# Patient Record
Sex: MALE | Race: OTHER | Employment: FULL TIME | URBAN - METROPOLITAN AREA
[De-identification: names, ages, dates, MRNs, and addresses within clinical notes are randomized per-mention and may not be internally consistent; named-entity substitution may affect disease eponyms.]

---

## 2018-04-09 NOTE — PROGRESS NOTES
SPOKE WITH THE PATIENT   HE IS CURRENTLY TAKING EFFEXOR, ADDERALL AND BUSPAR.   PER DR CHAMBERS PT CAN STAY ON EFFEXOR AND BUSPAR  PT WAS INFORMED TO STAY OFF ADDERALL FOR 48 HRS

## 2018-04-13 ENCOUNTER — HOSPITAL ENCOUNTER (OUTPATIENT)
Dept: GENERAL RADIOLOGY | Age: 38
Discharge: HOME | End: 2018-04-13
Attending: NEUROLOGICAL SURGERY | Admitting: RADIOLOGY
Payer: COMMERCIAL

## 2018-04-13 ENCOUNTER — APPOINTMENT (OUTPATIENT)
Dept: CT IMAGING | Age: 38
End: 2018-04-13
Attending: NEUROLOGICAL SURGERY
Payer: COMMERCIAL

## 2018-04-13 VITALS
HEIGHT: 66 IN | DIASTOLIC BLOOD PRESSURE: 70 MMHG | SYSTOLIC BLOOD PRESSURE: 124 MMHG | WEIGHT: 206 LBS | HEART RATE: 79 BPM | OXYGEN SATURATION: 97 % | TEMPERATURE: 97.8 F | RESPIRATION RATE: 16 BRPM | BODY MASS INDEX: 33.11 KG/M2

## 2018-04-13 DIAGNOSIS — M54.16 LUMBAR RADICULOPATHY: ICD-10-CM

## 2018-04-13 PROCEDURE — 74011250637 HC RX REV CODE- 250/637: Performed by: RADIOLOGY

## 2018-04-13 PROCEDURE — 74011636320 HC RX REV CODE- 636/320: Performed by: NEUROLOGICAL SURGERY

## 2018-04-13 PROCEDURE — 62304 MYELOGRAPHY LUMBAR INJECTION: CPT

## 2018-04-13 PROCEDURE — 72132 CT LUMBAR SPINE W/DYE: CPT

## 2018-04-13 RX ORDER — OXYCODONE AND ACETAMINOPHEN 10; 325 MG/1; MG/1
1 TABLET ORAL
Status: DISCONTINUED | OUTPATIENT
Start: 2018-04-13 | End: 2018-04-13 | Stop reason: HOSPADM

## 2018-04-13 RX ORDER — BUSPIRONE HYDROCHLORIDE 10 MG/1
10 TABLET ORAL 2 TIMES DAILY
COMMUNITY

## 2018-04-13 RX ORDER — DIAZEPAM 5 MG/1
10 TABLET ORAL
Status: COMPLETED | OUTPATIENT
Start: 2018-04-13 | End: 2018-04-13

## 2018-04-13 RX ORDER — LIDOCAINE HYDROCHLORIDE 10 MG/ML
1-5 INJECTION, SOLUTION EPIDURAL; INFILTRATION; INTRACAUDAL; PERINEURAL
Status: DISCONTINUED | OUTPATIENT
Start: 2018-04-13 | End: 2018-04-13 | Stop reason: HOSPADM

## 2018-04-13 RX ORDER — ACETAMINOPHEN 325 MG/1
650 TABLET ORAL
Status: DISCONTINUED | OUTPATIENT
Start: 2018-04-13 | End: 2018-04-13 | Stop reason: HOSPADM

## 2018-04-13 RX ORDER — DEXTROAMPHETAMINE SACCHARATE, AMPHETAMINE ASPARTATE, DEXTROAMPHETAMINE SULFATE AND AMPHETAMINE SULFATE 1.25; 1.25; 1.25; 1.25 MG/1; MG/1; MG/1; MG/1
5 TABLET ORAL
COMMUNITY
End: 2018-04-24

## 2018-04-13 RX ORDER — VENLAFAXINE 100 MG/1
150 TABLET ORAL 2 TIMES DAILY
COMMUNITY
End: 2018-04-24

## 2018-04-13 RX ORDER — LIDOCAINE HYDROCHLORIDE 10 MG/ML
1-5 INJECTION, SOLUTION EPIDURAL; INFILTRATION; INTRACAUDAL; PERINEURAL
Status: COMPLETED | OUTPATIENT
Start: 2018-04-13 | End: 2018-04-13

## 2018-04-13 RX ORDER — TRAZODONE HYDROCHLORIDE 50 MG/1
TABLET ORAL
COMMUNITY

## 2018-04-13 RX ORDER — NALOXONE HYDROCHLORIDE 0.4 MG/ML
0.4 INJECTION, SOLUTION INTRAMUSCULAR; INTRAVENOUS; SUBCUTANEOUS AS NEEDED
Status: DISCONTINUED | OUTPATIENT
Start: 2018-04-13 | End: 2018-04-13 | Stop reason: HOSPADM

## 2018-04-13 RX ADMIN — LIDOCAINE HYDROCHLORIDE 5 ML: 10 INJECTION, SOLUTION EPIDURAL; INFILTRATION; INTRACAUDAL; PERINEURAL at 11:01

## 2018-04-13 RX ADMIN — IOPAMIDOL 20 ML: 408 INJECTION, SOLUTION INTRATHECAL at 11:01

## 2018-04-13 RX ADMIN — DIAZEPAM 10 MG: 5 TABLET ORAL at 10:54

## 2018-04-13 NOTE — PROCEDURES
Vascular & Interventional Radiology Brief Procedure Note    Interventional Radiologist: Guanakito Curry    Procedure(s) Performed:  Myelogram, lumbar    Anesthesia:  Local      Findings:  Successful lumbar myelogram via L2/3 puncture.      Complications: None    Estimated Blood Loss:  minimal    Tubes and Drains: None    Specimens: None    Condition: Good    Disposition:  Outpatient    Plan: Observation x 4hrs    Lisseth Sabillon MD  [facility]  Vascular & Interventional Radiology  4/13/2018

## 2018-04-13 NOTE — IP AVS SNAPSHOT
Shaan Farias 
 
 
 [address] [place] [address] Patient: Alexis Lawrence MRN: [id number] SMN:[id number] About your hospitalization You were admitted on:  April 13, 2018 You last received care in the:  [address] [place] [address] You were discharged on:  April 13, 2018 Why you were hospitalized Your primary diagnosis was:  Not on File Follow-up Information Follow up With Details Comments Contact Info None   None (395) Patient stated that they have no PCP Discharge Orders None A check  indicates which time of day the medication should be taken. My Medications CONTINUE taking these medications Instructions Each Dose to Equal  
 Morning Noon Evening Bedtime ADDERALL 5 mg tablet Generic drug:  dextroamphetamine-amphetamine Your last dose was: Your next dose is: Take 5 mg by mouth. 5 mg  
    
   
   
   
  
 busPIRone 10 mg tablet Commonly known as:  BUSPAR Your last dose was: Your next dose is: Take 10 mg by mouth three (3) times daily. 10 mg  
    
   
   
   
  
 traZODone 50 mg tablet Commonly known as:  Noreene Vicky Your last dose was: Your next dose is: Take  by mouth nightly. venlafaxine 100 mg tablet Commonly known as:  [facility] [place] [facility] Your last dose was: Your next dose is: Take 100 mg by mouth three (3) times daily. 100 mg Discharge Instructions Valium (Diazepam) Discharge instructions You received the narcotic listed above. This medication was given to you to help decrease your anxiety/discomfort and allow you to complete your examination Common Side Effects: 
 
Impairment of memory for up to 24 hours, sleepiness, slurred speech, dizziness, visual disturbance such as blurred vision or difficulty focusing, confusion, euphoria (pain relief), chills, ears feeling blocked or dreaming. It is recommended that you do not drive or operate equipment for at least 24 hours. You should not drink any alcoholic beverages for at least 24 hours. Do not take any other muscle relaxers, sedatives, hypnotics, or mood altering medications for 24 hours unless ordered by your physician who is aware that you received this medication. Call your physician for any questions or problems. He/She may contact this Radiology Department for further information. Chaka Wilson RN  
 
 
DISCHARGE SUMMARY from Nurse PATIENT INSTRUCTIONS: 
 
After general anesthesia or intravenous sedation, for 24 hours or while taking prescription Narcotics: · Limit your activities · Do not drive and operate hazardous machinery · Do not make important personal or business decisions · Do  not drink alcoholic beverages · If you have not urinated within 8 hours after discharge, please contact your surgeon on call. Report the following to your surgeon: 
· Excessive pain, swelling, redness or odor of or around the surgical area · Temperature over 100.5 · Nausea and vomiting lasting longer than 4 hours or if unable to take medications · Any signs of decreased circulation or nerve impairment to extremity: change in color, persistent  numbness, tingling, coldness or increase pain · Any questions What to do at Home: These are general instructions for a healthy lifestyle: No smoking/ No tobacco products/ Avoid exposure to second hand smoke Surgeon General's Warning:  Quitting smoking now greatly reduces serious risk to your health. Obesity, smoking, and sedentary lifestyle greatly increases your risk for illness A healthy diet, regular physical exercise & weight monitoring are important for maintaining a healthy lifestyle You may be retaining fluid if you have a history of heart failure or if you experience any of the following symptoms:  Weight gain of 3 pounds or more overnight or 5 pounds in a week, increased swelling in our hands or feet or shortness of breath while lying flat in bed. Please call your doctor as soon as you notice any of these symptoms; do not wait until your next office visit. Recognize signs and symptoms of STROKE: 
 
F-face looks uneven A-arms unable to move or move unevenly S-speech slurred or non-existent T-time-call 911 as soon as signs and symptoms begin-DO NOT go Back to bed or wait to see if you get better-TIME IS BRAIN. Warning Signs of HEART ATTACK Call 911 if you have these symptoms: 
? Chest discomfort. Most heart attacks involve discomfort in the center of the chest that lasts more than a few minutes, or that goes away and comes back. It can feel like uncomfortable pressure, squeezing, fullness, or pain. ? Discomfort in other areas of the upper body. Symptoms can include pain or discomfort in one or both arms, the back, neck, jaw, or stomach. ? Shortness of breath with or without chest discomfort. ? Other signs may include breaking out in a cold sweat, nausea, or lightheadedness. Don't wait more than five minutes to call [address]! Fast action can save your life. Calling 911 is almost always the fastest way to get lifesaving treatment. Emergency Medical Services staff can begin treatment when they arrive  up to an hour sooner than if someone gets to the hospital by car. The discharge information has been reviewed with the patient. The patient verbalized understanding. Discharge medications reviewed with the patient and appropriate educational materials and side effects teaching were provided. ___________________________________________________________________________________________________________________________________ Patient armband removed and given to patient to take home.   Patient was informed of the privacy risks if armband lost or stolen Introducing [facility] & HEALTH SERVICES! [employer] introduces [employer] patient portal. Now you can access parts of your medical record, email your doctor's office, and request medication refills online. 1. In your internet browser, go to [email or web address] 2. Click on the First Time User? Click Here link in the Sign In box. You will see the New Member Sign Up page. 3. Enter your [employer] Access Code exactly as it appears below. You will not need to use this code after youve completed the sign-up process. If you do not sign up before the expiration date, you must request a new code. · [employer] Access Code: [email or web address] Expires: 7/3/2018 11:59 AM 
 
4. Enter the last four digits of your Social Security Number (xxxx) and Date of Birth (mm/dd/yyyy) as indicated and click Submit. You will be taken to the next sign-up page. 5. Create a [employer] ID. This will be your [employer] login ID and cannot be changed, so think of one that is secure and easy to remember. 6. Create a [employer] password. You can change your password at any time. 7. Enter your Password Reset Question and Answer. This can be used at a later time if you forget your password. 8. Enter your e-mail address. You will receive e-mail notification when new information is available in [address] 9. Click Sign Up. You can now view and download portions of your medical record. 10. Click the Download Summary menu link to download a portable copy of your medical information. If you have questions, please visit the Frequently Asked Questions section of the [employer] website. Remember, [employer] is NOT to be used for urgent needs. For medical emergencies, dial 911. Now available from your iPhone and Android! Introducing Adán Mcintosh As a [employer] patient, I wanted to make you aware of our electronic visit tool called Adán Mcintosh. Icanbesponsored allows you to connect within minutes with a medical provider 24 hours a day, seven days a week via a mobile device or tablet or logging into a secure website from your computer. You can access M[employer] Party from anywhere in the [place]. A virtual visit might be right for you when you have a simple condition and feel like you just dont want to get out of bed, or cant get away from work for an appointment, when your regular [facility] provider is not available (evenings, weekends or holidays), or when youre out of town and need minor care. Electronic visits cost only $49 and if the [employer] provider determines a prescription is needed to treat your condition, one can be electronically transmitted to a nearby pharmacy*. Please take a moment to enroll today if you have not already done so. The enrollment process is free and takes just a few minutes. To enroll, please download the Icanbesponsored fco to your tablet or phone, or visit [email or web address] to enroll on your computer. And, as an [address] [place] [address] patient with a [employer] account, the results of your visits will be scanned into your electronic medical record and your primary care provider will be able to view the scanned results. We urge you to continue to see your regular EX[facility] provider for your ongoing medical care. And while your primary care provider may not be the one available when you seek a [employer] virtual visit, the peace of mind you get from getting a real diagnosis real time can be priceless. For more information on [employer], view our Frequently Asked Questions (FAQs) at [email or web address]. Sincerely, 
 
Dianah Sicard, MD 
Chief Medical Officer Rommel8 Yanira Pittman *:  certain medications cannot be prescribed via Mouth Party Unresulted Labs-Please follow up with your PCP about these lab tests Order Current Status CT SPINE LUMB W CONT In process XR MYELO LUMBAR In process Providers Seen During Your Hospitalization Provider Specialty Primary office phone Shannan Castro MD Neurosurgery [phone] Your Primary Care Physician (PCP) Primary Care Physician Office Phone Office Fax NONE ** None ** ** None ** You are allergic to the following No active allergies Recent Documentation Height Weight BMI Smoking Status 1.676 m 93.4 kg 33.25 kg/m2 Former Smoker Emergency Contacts Name Discharge Info Relation Home Work Mobile Nuria Moon N/A  AT THIS TIME [6] Spouse [3] [phone] Patient Belongings The following personal items are in your possession at time of discharge: 
  Dental Appliances: None  Visual Aid: Glasses      Home Medications: None   Jewelry: None  Clothing: Undergarments, Footwear, Shirt, Socks    Other Valuables: Raysa Mckenzie [employer] Please provide this summary of care documentation to your next provider. Signatures-by signing, you are acknowledging that this After Visit Summary has been reviewed with you and you have received a copy. Patient Signature:  ____________________________________________________________ Date:  ____________________________________________________________  
  
Ty Tang Provider Signature:  ____________________________________________________________ Date:  ____________________________________________________________

## 2018-04-13 NOTE — PROGRESS NOTES
RADIOLOGY PRE PROCEDURE NOTE     Pt requested to stay on venlafaxine and buspirone for procedure for far of withdrawal.  We talked about reason for med restrictions surrounding myelography with regard to seizure risk, and he understood and decided to stay on the meds. To mitigate the risk of seizure I gave him valium for seizure prophylaxis, and will keep him in obs a little longer than the routine four hours.       Eloy Riedel, MD  Neuroradiology

## 2018-04-13 NOTE — DISCHARGE INSTRUCTIONS
Valium (Diazepam) Discharge instructions    You received the narcotic listed above. This medication was given to you to help decrease your anxiety/discomfort and allow you to complete your examination       Common Side Effects:    Impairment of memory for up to 24 hours, sleepiness, slurred speech, dizziness, visual disturbance such as blurred vision or difficulty focusing, confusion, euphoria (pain relief), chills, ears feeling blocked or dreaming. It is recommended that you do not drive or operate equipment for at least 24 hours. You should not drink any alcoholic beverages for at least 24 hours. Do not take any other muscle relaxers, sedatives, hypnotics, or mood altering medications for 24 hours unless ordered by your physician who is aware that you received this medication. Call your physician for any questions or problems. He/She may contact this Radiology Department for further information. Teresa Lugo RN       DISCHARGE SUMMARY from Nurse    PATIENT INSTRUCTIONS:    After general anesthesia or intravenous sedation, for 24 hours or while taking prescription Narcotics:  · Limit your activities  · Do not drive and operate hazardous machinery  · Do not make important personal or business decisions  · Do  not drink alcoholic beverages  · If you have not urinated within 8 hours after discharge, please contact your surgeon on call.     Report the following to your surgeon:  · Excessive pain, swelling, redness or odor of or around the surgical area  · Temperature over 100.5  · Nausea and vomiting lasting longer than 4 hours or if unable to take medications  · Any signs of decreased circulation or nerve impairment to extremity: change in color, persistent  numbness, tingling, coldness or increase pain  · Any questions    What to do at Home:    These are general instructions for a healthy lifestyle:    No smoking/ No tobacco products/ Avoid exposure to second hand smoke  Surgeon General's Warning: Quitting smoking now greatly reduces serious risk to your health. Obesity, smoking, and sedentary lifestyle greatly increases your risk for illness    A healthy diet, regular physical exercise & weight monitoring are important for maintaining a healthy lifestyle    You may be retaining fluid if you have a history of heart failure or if you experience any of the following symptoms:  Weight gain of 3 pounds or more overnight or 5 pounds in a week, increased swelling in our hands or feet or shortness of breath while lying flat in bed. Please call your doctor as soon as you notice any of these symptoms; do not wait until your next office visit. Recognize signs and symptoms of STROKE:    F-face looks uneven    A-arms unable to move or move unevenly    S-speech slurred or non-existent    T-time-call 911 as soon as signs and symptoms begin-DO NOT go       Back to bed or wait to see if you get better-TIME IS BRAIN. Warning Signs of HEART ATTACK     Call 911 if you have these symptoms:   Chest discomfort. Most heart attacks involve discomfort in the center of the chest that lasts more than a few minutes, or that goes away and comes back. It can feel like uncomfortable pressure, squeezing, fullness, or pain.  Discomfort in other areas of the upper body. Symptoms can include pain or discomfort in one or both arms, the back, neck, jaw, or stomach.  Shortness of breath with or without chest discomfort.  Other signs may include breaking out in a cold sweat, nausea, or lightheadedness. Don't wait more than five minutes to call 911 - MINUTES MATTER! Fast action can save your life. Calling 911 is almost always the fastest way to get lifesaving treatment. Emergency Medical Services staff can begin treatment when they arrive -- up to an hour sooner than if someone gets to the hospital by car. The discharge information has been reviewed with the patient. The patient verbalized understanding.   Discharge medications reviewed with the patient and appropriate educational materials and side effects teaching were provided. ___________________________________________________________________________________________________________________________________     Patient armband removed and given to patient to take home.   Patient was informed of the privacy risks if armband lost or stolen

## 2018-04-13 NOTE — IP AVS SNAPSHOT
Summary of Care Report The Summary of Care report has been created to help improve care coordination. Users with access to VISEO or [address] (Web-based application) may access additional patient information including the Discharge Summary. If you are not currently a [address] user and need more information, please call the number listed below in the [place] [address] section and ask to be connected with Medical Records. Facility Information Name Address Phone [facility] [address] [facility] [address] [phone] Patient Information Patient Name Sex  Jeevan Montgomery ([id number]) Male 1980 Discharge Information Admitting Provider Service Area Unit Gael Hernadez MD / [id number]  / [phone] Discharge Provider Discharge Date/Time Discharge Disposition Destination (none) 2018 16:30 (Pending) [facility] (none) Patient Language Language ENGLISH [13] You are allergic to the following No active allergies Current Discharge Medication List  
  
CONTINUE these medications which have NOT CHANGED Dose & Instructions Dispensing Information Comments ADDERALL 5 mg tablet Generic drug:  dextroamphetamine-amphetamine Dose:  5 mg Take 5 mg by mouth. Refills:  0  
   
 busPIRone 10 mg tablet Commonly known as:  BUSPAR Dose:  10 mg Take 10 mg by mouth three (3) times daily. Refills:  0  
   
 traZODone 50 mg tablet Commonly known as:  Georganna Quiver Take  by mouth nightly. Refills:  0  
   
 venlafaxine 100 mg tablet Commonly known as:  [facility] Dose:  100 mg Take 100 mg by mouth three (3) times daily. Refills:  0 Surgery Information ID Date/Time Status Primary Surgeon All Procedures Location [id number] 4/13/2018 [facility] RAD ANGIO IR OR-DO NOT SCHEDULE Follow-up Information Follow up With Details Comments Contact Info None   None (395) Patient stated that they have no PCP Discharge Instructions Valium (Diazepam) Discharge instructions You received the narcotic listed above. This medication was given to you to help decrease your anxiety/discomfort and allow you to complete your examination Common Side Effects: 
 
Impairment of memory for up to 24 hours, sleepiness, slurred speech, dizziness, visual disturbance such as blurred vision or difficulty focusing, confusion, euphoria (pain relief), chills, ears feeling blocked or dreaming. It is recommended that you do not drive or operate equipment for at least 24 hours. You should not drink any alcoholic beverages for at least 24 hours. Do not take any other muscle relaxers, sedatives, hypnotics, or mood altering medications for 24 hours unless ordered by your physician who is aware that you received this medication. Call your physician for any questions or problems. He/She may contact this Radiology Department for further information. Jose Barone RN  
 
 
DISCHARGE SUMMARY from Nurse PATIENT INSTRUCTIONS: 
 
After general anesthesia or intravenous sedation, for 24 hours or while taking prescription Narcotics: · Limit your activities · Do not drive and operate hazardous machinery · Do not make important personal or business decisions · Do  not drink alcoholic beverages · If you have not urinated within 8 hours after discharge, please contact your surgeon on call. Report the following to your surgeon: 
· Excessive pain, swelling, redness or odor of or around the surgical area · Temperature over 100.5 · Nausea and vomiting lasting longer than 4 hours or if unable to take medications · Any signs of decreased circulation or nerve impairment to extremity: change in color, persistent  numbness, tingling, coldness or increase pain · Any questions What to do at Home: These are general instructions for a healthy lifestyle: No smoking/ No tobacco products/ Avoid exposure to second hand smoke Surgeon General's Warning:  Quitting smoking now greatly reduces serious risk to your health. Obesity, smoking, and sedentary lifestyle greatly increases your risk for illness A healthy diet, regular physical exercise & weight monitoring are important for maintaining a healthy lifestyle You may be retaining fluid if you have a history of heart failure or if you experience any of the following symptoms:  Weight gain of 3 pounds or more overnight or 5 pounds in a week, increased swelling in our hands or feet or shortness of breath while lying flat in bed. Please call your doctor as soon as you notice any of these symptoms; do not wait until your next office visit. Recognize signs and symptoms of STROKE: 
 
F-face looks uneven A-arms unable to move or move unevenly S-speech slurred or non-existent T-time-call 911 as soon as signs and symptoms begin-DO NOT go Back to bed or wait to see if you get better-TIME IS BRAIN. Warning Signs of HEART ATTACK Call 911 if you have these symptoms: 
? Chest discomfort. Most heart attacks involve discomfort in the center of the chest that lasts more than a few minutes, or that goes away and comes back. It can feel like uncomfortable pressure, squeezing, fullness, or pain. ? Discomfort in other areas of the upper body. Symptoms can include pain or discomfort in one or both arms, the back, neck, jaw, or stomach. ? Shortness of breath with or without chest discomfort. ? Other signs may include breaking out in a cold sweat, nausea, or lightheadedness. Don't wait more than five minutes to call [address]! Fast action can save your life.  Calling 911 is almost always the fastest way to get lifesaving treatment. Emergency Medical Services staff can begin treatment when they arrive  up to an hour sooner than if someone gets to the hospital by car. The discharge information has been reviewed with the patient. The patient verbalized understanding. Discharge medications reviewed with the patient and appropriate educational materials and side effects teaching were provided. ___________________________________________________________________________________________________________________________________ Patient armband removed and given to patient to take home. Patient was informed of the privacy risks if armband lost or stolen Chart Review Routing History No Routing History on File

## 2018-05-02 PROBLEM — M51.26 HNP (HERNIATED NUCLEUS PULPOSUS), LUMBAR: Status: ACTIVE | Noted: 2018-05-02
